# Patient Record
Sex: FEMALE | Race: WHITE | NOT HISPANIC OR LATINO | Employment: UNEMPLOYED | URBAN - NONMETROPOLITAN AREA
[De-identification: names, ages, dates, MRNs, and addresses within clinical notes are randomized per-mention and may not be internally consistent; named-entity substitution may affect disease eponyms.]

---

## 2024-11-02 ENCOUNTER — HOSPITAL ENCOUNTER (EMERGENCY)
Facility: HOSPITAL | Age: 39
Discharge: HOME | End: 2024-11-03
Attending: EMERGENCY MEDICINE | Admitting: EMERGENCY MEDICINE
Payer: COMMERCIAL

## 2024-11-02 ENCOUNTER — APPOINTMENT (OUTPATIENT)
Dept: GENERAL RADIOLOGY | Facility: HOSPITAL | Age: 39
End: 2024-11-02
Payer: COMMERCIAL

## 2024-11-02 DIAGNOSIS — R07.9 CHEST PAIN, UNSPECIFIED TYPE: Primary | ICD-10-CM

## 2024-11-02 LAB
ALBUMIN SERPL-MCNC: 3.6 G/DL (ref 3.5–5.2)
ALBUMIN/GLOB SERPL: 1 G/DL
ALP SERPL-CCNC: 114 U/L (ref 39–117)
ALT SERPL W P-5'-P-CCNC: 9 U/L (ref 1–33)
ANION GAP SERPL CALCULATED.3IONS-SCNC: 14 MMOL/L (ref 5–15)
AST SERPL-CCNC: 9 U/L (ref 1–32)
B PARAPERT DNA SPEC QL NAA+PROBE: NOT DETECTED
B PERT DNA SPEC QL NAA+PROBE: NOT DETECTED
BASOPHILS # BLD AUTO: 0.02 10*3/MM3 (ref 0–0.2)
BASOPHILS NFR BLD AUTO: 0.3 % (ref 0–1.5)
BILIRUB SERPL-MCNC: 0.3 MG/DL (ref 0–1.2)
BUN SERPL-MCNC: 17 MG/DL (ref 6–20)
BUN/CREAT SERPL: 27 (ref 7–25)
C PNEUM DNA NPH QL NAA+NON-PROBE: NOT DETECTED
CALCIUM SPEC-SCNC: 9.2 MG/DL (ref 8.6–10.5)
CHLORIDE SERPL-SCNC: 100 MMOL/L (ref 98–107)
CO2 SERPL-SCNC: 24 MMOL/L (ref 22–29)
CREAT SERPL-MCNC: 0.63 MG/DL (ref 0.57–1)
D DIMER PPP FEU-MCNC: 0.36 MCGFEU/ML (ref 0–0.5)
DEPRECATED RDW RBC AUTO: 38.7 FL (ref 37–54)
EGFRCR SERPLBLD CKD-EPI 2021: 115.9 ML/MIN/1.73
EOSINOPHIL # BLD AUTO: 0.12 10*3/MM3 (ref 0–0.4)
EOSINOPHIL NFR BLD AUTO: 1.9 % (ref 0.3–6.2)
ERYTHROCYTE [DISTWIDTH] IN BLOOD BY AUTOMATED COUNT: 12.8 % (ref 12.3–15.4)
FLUAV SUBTYP SPEC NAA+PROBE: NOT DETECTED
FLUBV RNA ISLT QL NAA+PROBE: NOT DETECTED
GLOBULIN UR ELPH-MCNC: 3.7 GM/DL
GLUCOSE SERPL-MCNC: 166 MG/DL (ref 65–99)
HADV DNA SPEC NAA+PROBE: NOT DETECTED
HCOV 229E RNA SPEC QL NAA+PROBE: NOT DETECTED
HCOV HKU1 RNA SPEC QL NAA+PROBE: NOT DETECTED
HCOV NL63 RNA SPEC QL NAA+PROBE: NOT DETECTED
HCOV OC43 RNA SPEC QL NAA+PROBE: NOT DETECTED
HCT VFR BLD AUTO: 37.6 % (ref 34–46.6)
HGB BLD-MCNC: 12 G/DL (ref 12–15.9)
HMPV RNA NPH QL NAA+NON-PROBE: NOT DETECTED
HOLD SPECIMEN: NORMAL
HPIV1 RNA ISLT QL NAA+PROBE: NOT DETECTED
HPIV2 RNA SPEC QL NAA+PROBE: NOT DETECTED
HPIV3 RNA NPH QL NAA+PROBE: NOT DETECTED
HPIV4 P GENE NPH QL NAA+PROBE: NOT DETECTED
IMM GRANULOCYTES # BLD AUTO: 0.01 10*3/MM3 (ref 0–0.05)
IMM GRANULOCYTES NFR BLD AUTO: 0.2 % (ref 0–0.5)
LIPASE SERPL-CCNC: 21 U/L (ref 13–60)
LYMPHOCYTES # BLD AUTO: 1.66 10*3/MM3 (ref 0.7–3.1)
LYMPHOCYTES NFR BLD AUTO: 26.5 % (ref 19.6–45.3)
M PNEUMO IGG SER IA-ACNC: NOT DETECTED
MCH RBC QN AUTO: 27 PG (ref 26.6–33)
MCHC RBC AUTO-ENTMCNC: 31.9 G/DL (ref 31.5–35.7)
MCV RBC AUTO: 84.7 FL (ref 79–97)
MONOCYTES # BLD AUTO: 0.27 10*3/MM3 (ref 0.1–0.9)
MONOCYTES NFR BLD AUTO: 4.3 % (ref 5–12)
NEUTROPHILS NFR BLD AUTO: 4.18 10*3/MM3 (ref 1.7–7)
NEUTROPHILS NFR BLD AUTO: 66.8 % (ref 42.7–76)
NRBC BLD AUTO-RTO: 0 /100 WBC (ref 0–0.2)
NT-PROBNP SERPL-MCNC: 647.7 PG/ML (ref 0–450)
PLATELET # BLD AUTO: 189 10*3/MM3 (ref 140–450)
PMV BLD AUTO: 10.7 FL (ref 6–12)
POTASSIUM SERPL-SCNC: 3.5 MMOL/L (ref 3.5–5.2)
PROT SERPL-MCNC: 7.3 G/DL (ref 6–8.5)
RBC # BLD AUTO: 4.44 10*6/MM3 (ref 3.77–5.28)
RHINOVIRUS RNA SPEC NAA+PROBE: NOT DETECTED
RSV RNA NPH QL NAA+NON-PROBE: NOT DETECTED
SARS-COV-2 RNA NPH QL NAA+NON-PROBE: NOT DETECTED
SODIUM SERPL-SCNC: 138 MMOL/L (ref 136–145)
TROPONIN T SERPL HS-MCNC: <6 NG/L
WBC NRBC COR # BLD AUTO: 6.26 10*3/MM3 (ref 3.4–10.8)
WHOLE BLOOD HOLD COAG: NORMAL
WHOLE BLOOD HOLD SPECIMEN: NORMAL

## 2024-11-02 PROCEDURE — 85379 FIBRIN DEGRADATION QUANT: CPT | Performed by: EMERGENCY MEDICINE

## 2024-11-02 PROCEDURE — 84484 ASSAY OF TROPONIN QUANT: CPT | Performed by: EMERGENCY MEDICINE

## 2024-11-02 PROCEDURE — 83690 ASSAY OF LIPASE: CPT | Performed by: EMERGENCY MEDICINE

## 2024-11-02 PROCEDURE — 0202U NFCT DS 22 TRGT SARS-COV-2: CPT | Performed by: EMERGENCY MEDICINE

## 2024-11-02 PROCEDURE — 85025 COMPLETE CBC W/AUTO DIFF WBC: CPT | Performed by: EMERGENCY MEDICINE

## 2024-11-02 PROCEDURE — 83880 ASSAY OF NATRIURETIC PEPTIDE: CPT | Performed by: EMERGENCY MEDICINE

## 2024-11-02 PROCEDURE — 99284 EMERGENCY DEPT VISIT MOD MDM: CPT

## 2024-11-02 PROCEDURE — 71045 X-RAY EXAM CHEST 1 VIEW: CPT

## 2024-11-02 PROCEDURE — 80053 COMPREHEN METABOLIC PANEL: CPT | Performed by: EMERGENCY MEDICINE

## 2024-11-02 PROCEDURE — 36415 COLL VENOUS BLD VENIPUNCTURE: CPT

## 2024-11-02 RX ORDER — DAPAGLIFLOZIN 5 MG/1
10 TABLET, FILM COATED ORAL DAILY
COMMUNITY

## 2024-11-02 RX ORDER — ROSUVASTATIN CALCIUM 20 MG/1
40 TABLET, COATED ORAL DAILY
COMMUNITY

## 2024-11-02 RX ORDER — KETOROLAC TROMETHAMINE 10 MG/1
10 TABLET, FILM COATED ORAL EVERY 8 HOURS
COMMUNITY

## 2024-11-02 RX ORDER — DESVENLAFAXINE 50 MG/1
50 TABLET, FILM COATED, EXTENDED RELEASE ORAL DAILY
COMMUNITY

## 2024-11-02 RX ORDER — TRAZODONE HYDROCHLORIDE 50 MG/1
50 TABLET, FILM COATED ORAL NIGHTLY
COMMUNITY

## 2024-11-02 RX ORDER — ASPIRIN 81 MG/1
81 TABLET, CHEWABLE ORAL DAILY
COMMUNITY

## 2024-11-02 RX ORDER — CETIRIZINE HYDROCHLORIDE 10 MG/1
10 TABLET ORAL DAILY
COMMUNITY

## 2024-11-02 RX ORDER — ALLOPURINOL 100 MG/1
100 TABLET ORAL DAILY
COMMUNITY

## 2024-11-03 VITALS
SYSTOLIC BLOOD PRESSURE: 134 MMHG | BODY MASS INDEX: 38.28 KG/M2 | DIASTOLIC BLOOD PRESSURE: 83 MMHG | TEMPERATURE: 98.3 F | OXYGEN SATURATION: 97 % | WEIGHT: 208 LBS | HEIGHT: 62 IN | HEART RATE: 107 BPM | RESPIRATION RATE: 20 BRPM

## 2024-11-03 NOTE — ED PROVIDER NOTES
"EMERGENCY DEPARTMENT ATTENDING NOTE    Patient Name: Elaina Stiles    Chief Complaint   Patient presents with    Chest Pain       PATIENT PRESENTATION:  Elaina Stiles is a 39 y.o. female with PMH significant for coronary artery disease, 9 stents, aortic stenosis status post surgical correction at age 20, pulmonary valve replacement with a stem cell valve, not on anticoagulation who presents to the ED centralized chest pressure cough and radiation to her left scapula.  Reports it somewhat similar to prior chest pain symptoms she has had.  Last heart attack was 2 years ago.  Patient gets most of her cardiac care at [place] and [place].  Patient is unable to name her cardiologist there.  She does follow with Dr. Goetz around here.  Was seen at [facility] a day ago and they did a CT scan of her chest but she reports did not pull any heart labs.  They told her that she was fine.  Patient is not a smoker.  She is a diabetic on insulin.  Has had mild sputum production.  Denies any sick contacts.  Reports it feels somewhat similar to prior heart issues.  Takes her medications as prescribed and has not missed any doses.  Is not anticoagulation.  Denies any pleuritic symptoms, lower extremity pain or swelling, hemoptysis, estrogen products, recent surgeries, history of cancer, family history of clotting disorders.  No wheezing or history of reactive airway disease.  Symptoms have been going on for 3 days have never completely resolved.      PHYSICAL EXAM:   VS: /67   Pulse 105   Temp 98.3 °F (36.8 °C) (Oral)   Resp 20   Ht 157.5 cm (62\")   Wt 94.3 kg (208 lb)   SpO2 97%   BMI 38.04 kg/m²   GENERAL: well-nourished, well-developed, awake, alert, no acute distress, nontoxic appearing, comfortable, right upper extremity glucose monitor on  EYES: PERRL, sclerae anicteric, extraocular movements grossly intact, symmetric lids  EARS, NOSE, MOUTH, THROAT: atraumatic external nose and ears, moist mucous " membranes  NECK: symmetric, trachea midline  RESPIRATORY: unlabored respiratory effort, clear to auscultation bilaterally, good air movement  CARDIOVASCULAR: no murmurs, peripheral pulses 2+ and equal in all extremities  GI: soft, nontender, nondistended  MUSCULOSKELETAL/EXTREMITIES: extremities without obvious deformity, no lower extremity edema or swelling  SKIN: warm and dry with no obvious rashes  NEUROLOGIC: moving all 4 extremities symmetrically, CN II-XII grossly intact  PSYCHIATRIC: alert, pleasant and cooperative. Appropriate mood and affect.      MEDICAL DECISION MAKING:    Elaina Stiles is a 39 y.o. female who presented to the ED with chest pain, cough, congestion with tachycardia    Procedures    Differential Diagnosis Considered: ACS, PE, heart failure, reactive airway disease, aortic dissection    Labs Ordered:  Labs Reviewed   COMPREHENSIVE METABOLIC PANEL - Abnormal; Notable for the following components:       Result Value    Glucose 166 (*)     BUN/Creatinine Ratio 27.0 (*)     All other components within normal limits    Narrative:     GFR Normal >60  Chronic Kidney Disease <60  Kidney Failure <15     BNP (IN-HOUSE) - Abnormal; Notable for the following components:    proBNP 647.7 (*)     All other components within normal limits    Narrative:     This assay is used as an aid in the diagnosis of individuals suspected of having heart failure. It can be used as an aid in the diagnosis of acute decompensated heart failure (ADHF) in patients presenting with signs and symptoms of ADHF to the emergency department (ED). In addition, NT-proBNP of <300 pg/mL indicates ADHF is not likely.    Age Range Result Interpretation  NT-proBNP Concentration (pg/mL:      <50             Positive            >450                   Gray                 300-450                    Negative             <300    50-75           Positive            >900                  Gray                300-900                  Negative   "          <300      >75             Positive            >1800                  Gray                300-1800                  Negative            <300   CBC WITH AUTO DIFFERENTIAL - Abnormal; Notable for the following components:    Monocyte % 4.3 (*)     All other components within normal limits   RESPIRATORY PANEL PCR W/ COVID-19 (SARS-COV-2), NP SWAB IN UTM/VTP, 2 HR TAT - Normal    Narrative:     In the setting of a positive respiratory panel with a viral infection PLUS a negative procalcitonin without other underlying concern for bacterial infection, consider observing off antibiotics or discontinuation of antibiotics and continue supportive care. If the respiratory panel is positive for atypical bacterial infection (Bordetella pertussis, Chlamydophila pneumoniae, or Mycoplasma pneumoniae), consider antibiotic de-escalation to target atypical bacterial infection.   D-DIMER, QUANTITATIVE - Normal    Narrative:     According to the assay 's published package insert, a normal (<0.50 MCGFEU/mL) D-dimer result in conjunction with a non-high clinical probability assessment, excludes deep vein thrombosis (DVT) and pulmonary embolism (PE) with high sensitivity.    D-dimer values increase with age and this can make VTE exclusion of an older population difficult. To address this, the [employer], based on best available evidence and recent guidelines, recommends that clinicians use age-adjusted D-dimer thresholds in patients greater than 50 years of age with: a) a low probability of PE who do not meet all Pulmonary Embolism Rule Out Criteria, or b) in those with intermediate probability of PE.   The formula for an age-adjusted D-dimer cut-off is \"age/100\".  For example, a 60 year old patient would have an age-adjusted cut-off of 0.60 MCGFEU/mL and an 80 year old 0.80 MCGFEU/mL.   LIPASE - Normal   SINGLE HS TROPONIN T - Normal    Narrative:     High Sensitive Troponin T Reference " Range:  <14.0 ng/L- Negative Female for AMI  <22.0 ng/L- Negative Male for AMI  >=14 - Abnormal Female indicating possible myocardial injury.  >=22 - Abnormal Male indicating possible myocardial injury.   Clinicians would have to utilize clinical acumen, EKG, Troponin, and serial changes to determine if it is an Acute Myocardial Infarction or myocardial injury due to an underlying chronic condition.        RAINBOW DRAW    Narrative:     The following orders were created for panel order [place] Draw.  Procedure                               Abnormality         Status                     ---------                               -----------         ------                     Green Top (Gel)[[id number]]                                  Final result               Lavender Top[[id number]]                                     Final result               Red Top[[id number]]                                          Final result               [place] Top[[id number]]                                         Final result               Light Blue Top[[id number]]                                   Final result                 Please view results for these tests on the individual orders.   GREEN TOP   LAVENDER TOP   RED TOP   GRAY TOP   LIGHT BLUE TOP   CBC AND DIFFERENTIAL    Narrative:     The following orders were created for panel order CBC & Differential.  Procedure                               Abnormality         Status                     ---------                               -----------         ------                     CBC Auto Differential[[id number]]        Abnormal            Final result                 Please view results for these tests on the individual orders.        Imaging Ordered:   XR Chest 1 View   Final Result   . No acute disease.       This report was signed and finalized on 11/2/2024 9:43 PM by Dr. Heri Calhoun MD.              Internal chart review:   Past Medical History:   Diagnosis Date    Aortic stenosis      REVISION AT 20       Past Surgical History:   Procedure Laterality Date    CORONARY ANGIOPLASTY WITH STENT PLACEMENT      X 9    ICD GENERATOR REPLACEMENT      PULMONARY VALVE REPLACEMENT      STEM CELL    SUBAORTIC STENOSIS REPAIR         Allergies   Allergen Reactions    Clindamycin/Lincomycin Angioedema    Humalog [Insulin Lispro] Other (See Comments)     Renal failure     Metformin Other (See Comments)     Renal failure        No current facility-administered medications for this encounter.    Current Outpatient Medications:     allopurinol (ZYLOPRIM) 100 MG tablet, Take 1 tablet by mouth Daily., Disp: , Rfl:     aspirin 81 MG chewable tablet, Chew 1 tablet Daily., Disp: , Rfl:     cetirizine (zyrTEC) 10 MG tablet, Take 1 tablet by mouth Daily., Disp: , Rfl:     dapagliflozin (FARXIGA) 5 MG tablet tablet, Take 2 tablets by mouth Daily., Disp: , Rfl:     desvenlafaxine (PRISTIQ) 50 MG 24 hr tablet, Take 1 tablet by mouth Daily., Disp: , Rfl:     insulin aspart (novoLOG FLEXPEN) 100 UNIT/ML solution pen-injector sc pen, Inject  under the skin into the appropriate area as directed 3 (Three) Times a Day With Meals., Disp: , Rfl:     insulin detemir (LEVEMIR) 100 UNIT/ML injection, Inject  under the skin into the appropriate area as directed Daily., Disp: , Rfl:     ketorolac (TORADOL) 10 MG tablet, Take 1 tablet by mouth Every 8 (Eight) Hours., Disp: , Rfl:     omeprazole (priLOSEC) 20 MG capsule, Take 1 capsule by mouth 2 (Two) Times a Day., Disp: , Rfl:     rosuvastatin (CRESTOR) 20 MG tablet, Take 2 tablets by mouth Daily., Disp: , Rfl:     traZODone (DESYREL) 50 MG tablet, Take 1 tablet by mouth Every Night., Disp: , Rfl:     External documents reviewed: Reviewed April 2024 stress test done by Dr. Ephraim Goetz with left EF of 39% and large anterior infarction with no significant ischemia in the LAD distribution and appears to have had infarction of the proximal LAD coronary artery.    My EKG interpretation:  EKG sinus tachycardia rate of 105, prolonged QTc of 536, right bundle branch block present and septal T wave inversions present.  No STEMI.    Unchanged EKG from 12 July 2024 in morphology and right bundle branch block.    My lab interpretation: See below    My imaging interpretation: Chest x-ray without acute process    Discussed with: Patient and significant other    Shared decision making: Regarding further CT imaging with the recent CT done at [facility], symptoms and normal chest x-ray    ED Course as of 11/02/24 2321   Sat Nov 02, 2024   2307 Chest x-ray without acute process, normal lipase do not suspect pancreatitis.  Negative D-dimer do not suspect PE.  Negative troponin with 3 days of constant symptoms so do not suspect ACS.  Negative viral swabs.  No significant abnormalities on patient's CMP.  Slightly elevated BNP but no significant signs of fluid overload on chest x-ray or exam so do not suspect acute heart failure exacerbation.  Normal CBC. [JJ]   5602 Extensive shared decision making regarding regarding patient's pain and symptoms.  Discussed that would not be unreasonable do a CT scan of her chest given her history of aortic surgical procedures, and pulmonic valve replacement since complications from those could result in chest pain.  Given no severe tearing symptoms or radiation not requiring any narcotic pain medication, no trauma, no significant hypertension low suspicion within normal chest x-ray.  After discussion risk first benefits patient believes that CT scan would be unnecessary she just wanted make sure it was not her heart.  Discussed that with a normal high sensitive troponin with 3 days of symptoms and a nonischemic EKG, do not suspect cardiac cause at this time and she can follow-up with her PCM and cardiologist for further evaluation and restratification. [JJ]      ED Course User Index  [JJ] Aj Martins MD        ED Critical Care time:     ED Diagnosis:  (R07.9) Chest pain,  unspecified type     Disposition: to home  Follow up plan: PCP follow up within 2 days, return to ED immediately if symptoms worsen        Signed:  Aj Martins MD  Emergency Medicine Physician    Please note that portions of this note were completed with a voice recognition program.      Aj Martins MD  11/02/24 [id number]

## 2024-12-04 LAB
QT INTERVAL: 406 MS
QTC INTERVAL: 536 MS

## 2025-01-30 ENCOUNTER — HOSPITAL ENCOUNTER (EMERGENCY)
Facility: HOSPITAL | Age: 40
Discharge: HOME | End: 2025-01-31
Attending: FAMILY MEDICINE
Payer: COMMERCIAL

## 2025-01-30 ENCOUNTER — APPOINTMENT (OUTPATIENT)
Dept: GENERAL RADIOLOGY | Facility: HOSPITAL | Age: 40
End: 2025-01-30
Payer: COMMERCIAL

## 2025-01-30 DIAGNOSIS — R07.89 ATYPICAL CHEST PAIN: ICD-10-CM

## 2025-01-30 DIAGNOSIS — E11.65 HYPERGLYCEMIA DUE TO DIABETES MELLITUS: Primary | ICD-10-CM

## 2025-01-30 LAB
ACETONE BLD QL: NEGATIVE
ALBUMIN SERPL-MCNC: 3.9 G/DL (ref 3.5–5.2)
ALBUMIN/GLOB SERPL: 1 G/DL
ALP SERPL-CCNC: 146 U/L (ref 39–117)
ALT SERPL W P-5'-P-CCNC: 13 U/L (ref 1–33)
ANION GAP SERPL CALCULATED.3IONS-SCNC: 15 MMOL/L (ref 5–15)
APTT PPP: 22 SECONDS (ref 24.5–36)
AST SERPL-CCNC: 16 U/L (ref 1–32)
BASOPHILS # BLD MANUAL: 0 10*3/MM3 (ref 0–0.2)
BASOPHILS NFR BLD MANUAL: 0 % (ref 0–1.5)
BILIRUB SERPL-MCNC: 0.2 MG/DL (ref 0–1.2)
BUN SERPL-MCNC: 15 MG/DL (ref 6–20)
BUN/CREAT SERPL: 20.5 (ref 7–25)
CALCIUM SPEC-SCNC: 9.5 MG/DL (ref 8.6–10.5)
CHLORIDE SERPL-SCNC: 97 MMOL/L (ref 98–107)
CO2 SERPL-SCNC: 22 MMOL/L (ref 22–29)
CREAT SERPL-MCNC: 0.73 MG/DL (ref 0.57–1)
D-LACTATE SERPL-SCNC: 2.8 MMOL/L (ref 0.5–2)
DEPRECATED RDW RBC AUTO: 37.8 FL (ref 37–54)
EGFRCR SERPLBLD CKD-EPI 2021: 107.4 ML/MIN/1.73
EOSINOPHIL # BLD MANUAL: 0.06 10*3/MM3 (ref 0–0.4)
EOSINOPHIL NFR BLD MANUAL: 1 % (ref 0.3–6.2)
ERYTHROCYTE [DISTWIDTH] IN BLOOD BY AUTOMATED COUNT: 13 % (ref 12.3–15.4)
GEN 5 1HR TROPONIN T REFLEX: 7 NG/L
GLOBULIN UR ELPH-MCNC: 4 GM/DL
GLUCOSE SERPL-MCNC: 564 MG/DL (ref 65–99)
HCT VFR BLD AUTO: 40.8 % (ref 34–46.6)
HGB BLD-MCNC: 13.2 G/DL (ref 12–15.9)
INR PPP: 0.94 (ref 0.91–1.09)
LIPASE SERPL-CCNC: 47 U/L (ref 13–60)
LYMPHOCYTES # BLD MANUAL: 1.11 10*3/MM3 (ref 0.7–3.1)
LYMPHOCYTES NFR BLD MANUAL: 4 % (ref 5–12)
MAGNESIUM SERPL-MCNC: 1.8 MG/DL (ref 1.6–2.6)
MCH RBC QN AUTO: 26.3 PG (ref 26.6–33)
MCHC RBC AUTO-ENTMCNC: 32.4 G/DL (ref 31.5–35.7)
MCV RBC AUTO: 81.3 FL (ref 79–97)
MONOCYTES # BLD: 0.25 10*3/MM3 (ref 0.1–0.9)
NEUTROPHILS # BLD AUTO: 4.74 10*3/MM3 (ref 1.7–7)
NEUTROPHILS NFR BLD MANUAL: 71.3 % (ref 42.7–76)
NEUTS BAND NFR BLD MANUAL: 5 % (ref 0–5)
NT-PROBNP SERPL-MCNC: 219.7 PG/ML (ref 0–450)
PHOSPHATE SERPL-MCNC: 3.2 MG/DL (ref 2.5–4.5)
PLASMA CELL PREC NFR BLD MANUAL: 1 % (ref 0–0)
PLATELET # BLD AUTO: 220 10*3/MM3 (ref 140–450)
PMV BLD AUTO: 11.1 FL (ref 6–12)
POLYCHROMASIA BLD QL SMEAR: ABNORMAL
POTASSIUM SERPL-SCNC: 4.4 MMOL/L (ref 3.5–5.2)
PROT SERPL-MCNC: 7.9 G/DL (ref 6–8.5)
PROTHROMBIN TIME: 13.1 SECONDS (ref 11.8–14.8)
RBC # BLD AUTO: 5.02 10*6/MM3 (ref 3.77–5.28)
SMALL PLATELETS BLD QL SMEAR: ABNORMAL
SODIUM SERPL-SCNC: 134 MMOL/L (ref 136–145)
TROPONIN T NUMERIC DELTA: -1 NG/L
TROPONIN T SERPL HS-MCNC: 8 NG/L
TSH SERPL DL<=0.05 MIU/L-ACNC: 0.67 UIU/ML (ref 0.27–4.2)
VARIANT LYMPHS NFR BLD MANUAL: 14.9 % (ref 19.6–45.3)
VARIANT LYMPHS NFR BLD MANUAL: 3 % (ref 0–5)
WBC NRBC COR # BLD AUTO: 6.22 10*3/MM3 (ref 3.4–10.8)

## 2025-01-30 PROCEDURE — 85610 PROTHROMBIN TIME: CPT | Performed by: FAMILY MEDICINE

## 2025-01-30 PROCEDURE — 85007 BL SMEAR W/DIFF WBC COUNT: CPT | Performed by: FAMILY MEDICINE

## 2025-01-30 PROCEDURE — 84484 ASSAY OF TROPONIN QUANT: CPT | Performed by: FAMILY MEDICINE

## 2025-01-30 PROCEDURE — 99284 EMERGENCY DEPT VISIT MOD MDM: CPT

## 2025-01-30 PROCEDURE — 80050 GENERAL HEALTH PANEL: CPT | Performed by: FAMILY MEDICINE

## 2025-01-30 PROCEDURE — 85730 THROMBOPLASTIN TIME PARTIAL: CPT | Performed by: FAMILY MEDICINE

## 2025-01-30 PROCEDURE — 93010 ELECTROCARDIOGRAM REPORT: CPT | Performed by: INTERNAL MEDICINE

## 2025-01-30 PROCEDURE — 84100 ASSAY OF PHOSPHORUS: CPT | Performed by: FAMILY MEDICINE

## 2025-01-30 PROCEDURE — 71045 X-RAY EXAM CHEST 1 VIEW: CPT

## 2025-01-30 PROCEDURE — 83735 ASSAY OF MAGNESIUM: CPT | Performed by: FAMILY MEDICINE

## 2025-01-30 PROCEDURE — 36415 COLL VENOUS BLD VENIPUNCTURE: CPT

## 2025-01-30 PROCEDURE — 83605 ASSAY OF LACTIC ACID: CPT | Performed by: FAMILY MEDICINE

## 2025-01-30 PROCEDURE — 82009 KETONE BODYS QUAL: CPT | Performed by: FAMILY MEDICINE

## 2025-01-30 PROCEDURE — 93005 ELECTROCARDIOGRAM TRACING: CPT

## 2025-01-30 PROCEDURE — 83690 ASSAY OF LIPASE: CPT | Performed by: FAMILY MEDICINE

## 2025-01-30 PROCEDURE — 83880 ASSAY OF NATRIURETIC PEPTIDE: CPT | Performed by: FAMILY MEDICINE

## 2025-01-30 PROCEDURE — 25810000003 SODIUM CHLORIDE 0.9 % SOLUTION: Performed by: FAMILY MEDICINE

## 2025-01-30 RX ORDER — NITROGLYCERIN 0.4 MG/1
0.4 TABLET SUBLINGUAL ONCE
Status: COMPLETED | OUTPATIENT
Start: 2025-01-30 | End: 2025-01-30

## 2025-01-30 RX ORDER — ASPIRIN 81 MG/1
324 TABLET, CHEWABLE ORAL ONCE
Status: COMPLETED | OUTPATIENT
Start: 2025-01-30 | End: 2025-01-30

## 2025-01-30 RX ADMIN — NITROGLYCERIN 0.4 MG: 0.4 TABLET SUBLINGUAL at 21:39

## 2025-01-30 RX ADMIN — ASPIRIN 324 MG: 81 TABLET, CHEWABLE ORAL at 21:38

## 2025-01-30 RX ADMIN — SODIUM CHLORIDE 1000 ML: 9 INJECTION, SOLUTION INTRAVENOUS at 23:21

## 2025-01-31 VITALS
BODY MASS INDEX: 36.8 KG/M2 | DIASTOLIC BLOOD PRESSURE: 82 MMHG | HEART RATE: 99 BPM | WEIGHT: 200 LBS | HEIGHT: 62 IN | SYSTOLIC BLOOD PRESSURE: 127 MMHG | TEMPERATURE: 98.4 F | RESPIRATION RATE: 16 BRPM | OXYGEN SATURATION: 100 %

## 2025-01-31 LAB
QT INTERVAL: 414 MS
QTC INTERVAL: 547 MS

## 2025-01-31 NOTE — DISCHARGE INSTRUCTIONS
Need to see your primary doctor about better control of your diabetes.  Follow-up outpatient with your cardiologist.

## 2025-01-31 NOTE — ED PROVIDER NOTES
HPI:     Patient is a 39-year-old white female presents to the emergency room with left-sided chest pain and cyst under pain that radiates to her neck.  Patient has a past history of a myocardial infarction 2 years ago had ICD pedes.  Patient states today her worsening pain in her chest is 9 out of 10.  She states that started at 1830 this evening while laying down.  She admits that she is a diabetic with hypertension and coronary artery disease and has not been taking her medications appropriately.  There is no trauma.  She also had some nonspecific abdominal pain but no vomiting or diarrhea or blood in her stool.    REVIEW OF SYSTEMS  CONSTITUTIONAL:  No complaints of fever, chills,or weakness  EYES:  No complaints of discharge   ENT: No complaints of sore throat or ear pain  CARDIOVASCULAR: Positive for chest pain 9 out of 10 has not taken any medications   RESPIRATORY:  No complaints of cough or shortness of breath  GI: Positive for abdominal pain generalized without vomiting or blood.  MUSCULOSKELETAL:  No complaints of back pain  SKIN:  No complaints of rash  NEUROLOGIC:  No complaints of headache, focal weakness, or sensory changes  ENDOCRINE:  No complaints of polyuria or polydipsia  LYMPHATIC:  No complaints of swollen glands  GENITOURINARY: No complaints of urinary frequency or hematuria        PAST MEDICAL HISTORY  Past Medical History:   Diagnosis Date    Aortic stenosis     REVISION AT 20    Coronary artery disease     Diabetes mellitus     Myocardial infarction        FAMILY HISTORY  History reviewed. No pertinent family history.    SOCIAL HISTORY  Social History     Socioeconomic History    Marital status:    Tobacco Use    Smoking status: Never    Smokeless tobacco: Never   Substance and Sexual Activity    Alcohol use: Never    Drug use: Never       IMMUNIZATION HISTORY  Deferred to primary care physician.    SURGICAL HISTORY  Past Surgical History:   Procedure Laterality Date    CARDIAC  "PACEMAKER PLACEMENT      CORONARY ANGIOPLASTY WITH STENT PLACEMENT      X 9    ICD GENERATOR REPLACEMENT      PULMONARY VALVE REPLACEMENT      STEM CELL    SUBAORTIC STENOSIS REPAIR         CURRENT MEDICATIONS  No current facility-administered medications for this encounter.    Current Outpatient Medications:     allopurinol (ZYLOPRIM) 100 MG tablet, Take 1 tablet by mouth Daily., Disp: , Rfl:     aspirin 81 MG chewable tablet, Chew 1 tablet Daily., Disp: , Rfl:     cetirizine (zyrTEC) 10 MG tablet, Take 1 tablet by mouth Daily., Disp: , Rfl:     dapagliflozin (FARXIGA) 5 MG tablet tablet, Take 2 tablets by mouth Daily., Disp: , Rfl:     desvenlafaxine (PRISTIQ) 50 MG 24 hr tablet, Take 1 tablet by mouth Daily., Disp: , Rfl:     insulin aspart (novoLOG FLEXPEN) 100 UNIT/ML solution pen-injector sc pen, Inject  under the skin into the appropriate area as directed 3 (Three) Times a Day With Meals., Disp: , Rfl:     insulin detemir (LEVEMIR) 100 UNIT/ML injection, Inject  under the skin into the appropriate area as directed Daily., Disp: , Rfl:     ketorolac (TORADOL) 10 MG tablet, Take 1 tablet by mouth Every 8 (Eight) Hours., Disp: , Rfl:     omeprazole (priLOSEC) 20 MG capsule, Take 1 capsule by mouth 2 (Two) Times a Day., Disp: , Rfl:     rosuvastatin (CRESTOR) 20 MG tablet, Take 2 tablets by mouth Daily., Disp: , Rfl:     traZODone (DESYREL) 50 MG tablet, Take 1 tablet by mouth Every Night., Disp: , Rfl:     ALLERGIES  Allergies   Allergen Reactions    Clindamycin/Lincomycin Angioedema    Humalog [Insulin Lispro] Other (See Comments)     Renal failure     Metformin Other (See Comments)     Renal failure            Cardiac exam    VITAL SIGNS:  /63   Pulse 105   Temp 97.7 °F (36.5 °C) (Oral)   Resp 20   Ht 157.5 cm (62\")   Wt 90.7 kg (200 lb)   LMP  (LMP Unknown) Comment: tubal  SpO2 95%   BMI 36.58 kg/m²     Constitutional: Patient is alert and in no distress.  Patient with moderate chest and " abdominal discomfort.    ENT: There is a normal pharynx with no acute erythema or exudate and oral mucosa is moist.  Nose is clear with no drainage.  Tympanic membranes intact and nonerythemic    Respiratory: Patient is clear to auscultation bilaterally with no wheezing or rhonchi.  Chest wall is mildly tender.  There are no external lesions on the chest.  There is no crepitance    Cardiovascular: S1-S2 tachycardic with a murmur there is tender to palpation of the left anterior chest near the patient's ICD.    Abdomen: Soft with bowel sounds are normal in all 4 quadrants.  Diffuse tenderness is noted but there is no rebound or guarding noted.  There is no abdominal distention or hepatosplenomegaly.    Genitourinary: No costovertebral angle tenderness to percussion or suprapubic tenderness to palpation  Integument: No acute lesions noted and color appears to be normal.    [place] Coma Scale: Total score 15    Neurological: Patient is alert and oriented x4 and no acute findings noted.  Speech is fluent and cognition is normal.  No evidence of acute CVA.  Cranial nerves II through XII intact.  Patient with normal motor function as well as reflexes and sensation      RADIOLOGY/PROCEDURES      XR Chest 1 View   Final Result             FUTURE APPOINTMENTS     Future Appointments   Date Time Provider Department Center   6/17/2025  9:30 AM Mason Cancino MD [facility]          EKG (reviewed and interpreted by me): Sinus tachycardia with a rate of 105 with a right bundle branch block no acute ST segment elevation or depression appreciated.      HEART SCORE     Patient history  1      Slightly suspicious (0 points)  2   ECG       Nonspecific repolarization disturbance (1 point)    3   Patient age       Less than 45 (0 points)  4   Risk factors (Hypercholesterolemia, Hypertension, diabetes, smoking, obesity)     More than 3 risk factors or atherosclerosis history (2 points)    5   Troponin       Less than normal limit (0  points)     6     TOTAL RISK NUMBER: 3    The three risk categories are described below:  Heart score MACE risk Recommendation  0 - 3 Low (1.7%) Discharge can be an option.  4 - 6 Intermediate (20.3%) Clinical observation and further investigations.  7 - 10 High (72.2%) Immediate invasive treatment        COURSE & MEDICAL DECISION MAKING       Patient's partial differential diagnosis can include:    Unstable angina, angina, non-STEMI, arrhythmia, pneumothorax, pulmonary embolism, electrolyte abnormality,  Prinzmetal angina, costochondritis pleurisy, pleural effusion, pulmonary edema, panic attack, esophageal spasm, GERD, gastritis, chest spasms, and others      No acute findings noted on the exam or laboratory test.  Patient has hyperglycemia with that is nonketotic and not in DKA.  Patient's troponin negative x 2.  Patient is given a fluid bolus of normal saline and patient feels comfortable going home follow-up with her PCP tomorrow to discuss her insulin control for her diabetes that is not controlling her blood sugar as well as following up outpatient with cardiology.    Patient's level of risk: Moderate        CRITICAL CARE    CRITICAL CARE: No    CRITICAL CARE TIME: None      Recent Results (from the past 24 hours)   ECG 12 Lead Chest Pain    Collection Time: 01/30/25  9:01 PM   Result Value Ref Range    QT Interval 414 ms    QTC Interval 547 ms   Comprehensive Metabolic Panel    Collection Time: 01/30/25  9:47 PM    Specimen: Blood   Result Value Ref Range    Glucose 564 (C) 65 - 99 mg/dL    BUN 15 6 - 20 mg/dL    Creatinine 0.73 0.57 - 1.00 mg/dL    Sodium 134 (L) 136 - 145 mmol/L    Potassium 4.4 3.5 - 5.2 mmol/L    Chloride 97 (L) 98 - 107 mmol/L    CO2 22.0 22.0 - 29.0 mmol/L    Calcium 9.5 8.6 - 10.5 mg/dL    Total Protein 7.9 6.0 - 8.5 g/dL    Albumin 3.9 3.5 - 5.2 g/dL    ALT (SGPT) 13 1 - 33 U/L    AST (SGOT) 16 1 - 32 U/L    Alkaline Phosphatase 146 (H) 39 - 117 U/L    Total Bilirubin 0.2 0.0 - 1.2  mg/dL    Globulin 4.0 gm/dL    A/G Ratio 1.0 g/dL    BUN/Creatinine Ratio 20.5 7.0 - 25.0    Anion Gap 15.0 5.0 - 15.0 mmol/L    eGFR 107.4 >60.0 mL/min/1.73   Protime-INR    Collection Time: 01/30/25  9:47 PM    Specimen: Blood   Result Value Ref Range    Protime 13.1 11.8 - 14.8 Seconds    INR 0.94 0.91 - 1.09   aPTT    Collection Time: 01/30/25  9:47 PM    Specimen: Blood   Result Value Ref Range    PTT 22.0 (L) 24.5 - 36.0 seconds   Lipase    Collection Time: 01/30/25  9:47 PM    Specimen: Blood   Result Value Ref Range    Lipase 47 13 - 60 U/L   BNP    Collection Time: 01/30/25  9:47 PM    Specimen: Blood   Result Value Ref Range    proBNP 219.7 0.0 - 450.0 pg/mL   High Sensitivity Troponin T    Collection Time: 01/30/25  9:47 PM    Specimen: Blood   Result Value Ref Range    HS Troponin T 8 <14 ng/L   Lactic Acid, Plasma    Collection Time: 01/30/25  9:47 PM    Specimen: Blood   Result Value Ref Range    Lactate 2.8 (C) 0.5 - 2.0 mmol/L   Magnesium    Collection Time: 01/30/25  9:47 PM    Specimen: Blood   Result Value Ref Range    Magnesium 1.8 1.6 - 2.6 mg/dL   TSH Rfx On Abnormal To Free T4    Collection Time: 01/30/25  9:47 PM    Specimen: Blood   Result Value Ref Range    TSH 0.670 0.270 - 4.200 uIU/mL   Phosphorus    Collection Time: 01/30/25  9:47 PM    Specimen: Blood   Result Value Ref Range    Phosphorus 3.2 2.5 - 4.5 mg/dL   CBC Auto Differential    Collection Time: 01/30/25  9:47 PM    Specimen: Blood   Result Value Ref Range    WBC 6.22 3.40 - 10.80 10*3/mm3    RBC 5.02 3.77 - 5.28 10*6/mm3    Hemoglobin 13.2 12.0 - 15.9 g/dL    Hematocrit 40.8 34.0 - 46.6 %    MCV 81.3 79.0 - 97.0 fL    MCH 26.3 (L) 26.6 - 33.0 pg    MCHC 32.4 31.5 - 35.7 g/dL    RDW 13.0 12.3 - 15.4 %    RDW-SD 37.8 37.0 - 54.0 fl    MPV 11.1 6.0 - 12.0 fL    Platelets 220 140 - 450 10*3/mm3   Manual Differential    Collection Time: 01/30/25  9:47 PM    Specimen: Blood   Result Value Ref Range    Neutrophil % 71.3 42.7 - 76.0 %     Lymphocyte % 14.9 (L) 19.6 - 45.3 %    Monocyte % 4.0 (L) 5.0 - 12.0 %    Eosinophil % 1.0 0.3 - 6.2 %    Basophil % 0.0 0.0 - 1.5 %    Bands %  5.0 0.0 - 5.0 %    Atypical Lymphocyte % 3.0 0.0 - 5.0 %    Plasma Cells % 1.0 (H) 0.0 - 0.0 %    Neutrophils Absolute 4.74 1.70 - 7.00 10*3/mm3    Lymphocytes Absolute 1.11 0.70 - 3.10 10*3/mm3    Monocytes Absolute 0.25 0.10 - 0.90 10*3/mm3    Eosinophils Absolute 0.06 0.00 - 0.40 10*3/mm3    Basophils Absolute 0.00 0.00 - 0.20 10*3/mm3    Polychromasia Slight/1+ None Seen    Platelet Estimate Decreased Normal   Acetone    Collection Time: 01/30/25  9:47 PM    Specimen: Blood   Result Value Ref Range    Acetone Negative Negative   High Sensitivity Troponin T 1Hr    Collection Time: 01/30/25 11:21 PM    Specimen: Blood   Result Value Ref Range    HS Troponin T 7 <14 ng/L    Troponin T Numeric Delta -1 Abnormal if >/=3 ng/L              Old charts were reviewed per [employer] EMR.  Pertinent details are summarized above.  All laboratory, radiologic, and EKG studies that were performed in the Emergency Department were a necessary part of the evaluation needed to exclude unstable or  emergent medical conditions.     Patient was hemodynamically and neurologically stable in the ED.   Pertinent studies were reviewed as above.     The patient received:  Medications   nitroglycerin (NITROSTAT) SL tablet 0.4 mg (0.4 mg Sublingual Given 1/30/25 2139)   aspirin chewable tablet 324 mg (324 mg Oral Given 1/30/25 2138)   sodium chloride 0.9 % bolus 1,000 mL (1,000 mL Intravenous New Bag 1/30/25 2321)            ED Disposition       ED Disposition   Discharge    Condition   Stable    Comment   --                 Dragon disclaimer:  Part of this note may be an electronic transcription/translation of spoken language to printed text using the Dragon Dictation System.    I have reviewed the patient’s prescription history via a prescription monitoring program.  This information is consistent  with my knowledge of the patient’s controlled substance use history.    Patient evaluated during Coronavirus Pandemic. Isolation practices followed according to [facility] policy.     FINAL IMPRESSION   Diagnosis Plan   1. Hyperglycemia due to diabetes mellitus        2. Atypical chest pain              MD Luis Armando Fraire Jr, Thomas Mark Jr., MD  01/31/25 0026